# Patient Record
Sex: FEMALE | URBAN - METROPOLITAN AREA
[De-identification: names, ages, dates, MRNs, and addresses within clinical notes are randomized per-mention and may not be internally consistent; named-entity substitution may affect disease eponyms.]

---

## 2017-01-05 ENCOUNTER — THERAPY VISIT (OUTPATIENT)
Dept: PHYSICAL THERAPY | Facility: CLINIC | Age: 82
End: 2017-01-05
Payer: MEDICARE

## 2017-01-05 DIAGNOSIS — M25.512 ACUTE PAIN OF LEFT SHOULDER: Primary | ICD-10-CM

## 2017-01-05 PROCEDURE — 97110 THERAPEUTIC EXERCISES: CPT | Mod: GP | Performed by: PHYSICAL THERAPIST

## 2017-01-05 PROCEDURE — 97140 MANUAL THERAPY 1/> REGIONS: CPT | Mod: GP | Performed by: PHYSICAL THERAPIST

## 2017-01-06 ENCOUNTER — TELEPHONE (OUTPATIENT)
Dept: GASTROENTEROLOGY | Facility: CLINIC | Age: 82
End: 2017-01-06

## 2017-01-06 ENCOUNTER — TELEPHONE (OUTPATIENT)
Dept: INTERNAL MEDICINE | Facility: CLINIC | Age: 82
End: 2017-01-06

## 2017-01-06 DIAGNOSIS — Z12.11 ENCOUNTER FOR SCREENING COLONOSCOPY: Primary | ICD-10-CM

## 2017-01-06 NOTE — TELEPHONE ENCOUNTER
Dr. Hendrickson    Pt's daughter calling, states that pt is going to have Colonoscopy on 1/11, was told to hold Coumadin 5 days prior to Colonoscopy.  She took her last dose of Coumadin this evening.  Do you suggest any bridge ?    Soon-Mi

## 2017-01-09 NOTE — TELEPHONE ENCOUNTER
Spoke with pt's daughter Archana and given the message from ,verbalize understanding.  Rhona Noe RN

## 2017-01-10 ENCOUNTER — TELEPHONE (OUTPATIENT)
Dept: GASTROENTEROLOGY | Facility: CLINIC | Age: 82
End: 2017-01-10

## 2017-01-11 ENCOUNTER — SURGERY (OUTPATIENT)
Age: 82
End: 2017-01-11

## 2017-01-11 DIAGNOSIS — Z79.01 LONG TERM CURRENT USE OF ANTICOAGULANT THERAPY: Primary | ICD-10-CM

## 2017-01-11 LAB — INR PPP: 1.18 (ref 0.86–1.14)

## 2017-01-11 PROCEDURE — 85610 PROTHROMBIN TIME: CPT | Performed by: INTERNAL MEDICINE

## 2017-01-11 PROCEDURE — 36415 COLL VENOUS BLD VENIPUNCTURE: CPT | Performed by: INTERNAL MEDICINE

## 2017-01-11 RX ADMIN — Medication 2 ML: at 02:00

## 2017-01-11 RX ADMIN — FENTANYL CITRATE 50 MCG: 50 INJECTION, SOLUTION INTRAMUSCULAR; INTRAVENOUS at 11:40

## 2017-01-11 RX ADMIN — FENTANYL CITRATE 50 MCG: 50 INJECTION, SOLUTION INTRAMUSCULAR; INTRAVENOUS at 11:45

## 2017-01-11 RX ADMIN — BENZOCAINE 1 SPRAY: 220 SPRAY, METERED PERIODONTAL at 11:40

## 2017-01-11 RX ADMIN — MIDAZOLAM HYDROCHLORIDE 1 MG: 1 INJECTION, SOLUTION INTRAMUSCULAR; INTRAVENOUS at 11:54

## 2017-01-11 RX ADMIN — MIDAZOLAM HYDROCHLORIDE 1 MG: 1 INJECTION, SOLUTION INTRAMUSCULAR; INTRAVENOUS at 11:42

## 2017-01-11 RX ADMIN — FENTANYL CITRATE 50 MCG: 50 INJECTION, SOLUTION INTRAMUSCULAR; INTRAVENOUS at 11:56

## 2017-01-11 RX ADMIN — MIDAZOLAM HYDROCHLORIDE 1 MG: 1 INJECTION, SOLUTION INTRAMUSCULAR; INTRAVENOUS at 11:40

## 2017-01-11 RX ADMIN — MIDAZOLAM HYDROCHLORIDE 1 MG: 1 INJECTION, SOLUTION INTRAMUSCULAR; INTRAVENOUS at 11:45

## 2017-01-11 NOTE — TELEPHONE ENCOUNTER
Patient's daughter called. Patient half-way through the prep. Feels full and a little dizzy when standing. Has been having BM's but still brown. Instructed okay to take an hour break and then attempt again. Can try refrigerating, using straw or lemon candy to make more palatable. Patient and daughter agree.

## 2017-01-12 ENCOUNTER — THERAPY VISIT (OUTPATIENT)
Dept: PHYSICAL THERAPY | Facility: CLINIC | Age: 82
End: 2017-01-12
Payer: MEDICARE

## 2017-01-12 DIAGNOSIS — M25.512 ACUTE PAIN OF LEFT SHOULDER: Primary | ICD-10-CM

## 2017-01-12 PROCEDURE — 97110 THERAPEUTIC EXERCISES: CPT | Mod: GP | Performed by: PHYSICAL THERAPIST

## 2017-01-12 PROCEDURE — 97112 NEUROMUSCULAR REEDUCATION: CPT | Mod: GP | Performed by: PHYSICAL THERAPIST

## 2017-01-16 ENCOUNTER — THERAPY VISIT (OUTPATIENT)
Dept: PHYSICAL THERAPY | Facility: CLINIC | Age: 82
End: 2017-01-16
Payer: MEDICARE

## 2017-01-16 DIAGNOSIS — M25.512 ACUTE PAIN OF LEFT SHOULDER: Primary | ICD-10-CM

## 2017-01-16 PROCEDURE — G8985 CARRY GOAL STATUS: HCPCS | Mod: GP | Performed by: PHYSICAL THERAPIST

## 2017-01-16 PROCEDURE — G8986 CARRY D/C STATUS: HCPCS | Mod: GP | Performed by: PHYSICAL THERAPIST

## 2017-01-16 PROCEDURE — 97110 THERAPEUTIC EXERCISES: CPT | Mod: GP | Performed by: PHYSICAL THERAPIST

## 2017-01-16 PROCEDURE — G8984 CARRY CURRENT STATUS: HCPCS | Mod: GP | Performed by: PHYSICAL THERAPIST

## 2017-01-16 PROCEDURE — 97112 NEUROMUSCULAR REEDUCATION: CPT | Mod: GP | Performed by: PHYSICAL THERAPIST

## 2017-01-16 NOTE — PROGRESS NOTES
Subjective:    HPI                    Objective:    System                   Shoulder Evaluation:  ROM:  AROM:  : minimal pain reported by patient.  Flexion:  Left:  128      Extension: Left: 56  Abduction:  Left: 118 (with some forward flexion)                               Strength:    Flexion: Left:4-/5   Pain:      Extension:  Left: 4+/5    Pain:      Abduction:  Left: 3/5  Pain:                        Special Tests:  Special tests assessed shoulder: (+) Speeds, Eliseo-Van, Empty Can.  Left shoulder positive for the following special tests:  Impingement    Palpation:    Left shoulder tenderness present at:  Acrimioclavicular and Bicipital Groove    Mobility Tests:  not assessed                                                   General     ROS    Assessment/Plan:      DISCHARGE REPORT    Progress reporting period is from 12/29/16 to 1/16/17.       SUBJECTIVE  Subjective changes noted by patient:  Pt stated the L shoulder is feeling much better and feels she can continue with her HEP when in [place].  Patient if leaving for [place] on Thursday 1/19/17.    Current pain level is  5/10.     Previous pain level was  5/10.   Changes in function:  Yes (See Goal flowsheet attached for changes in current functional level)  Adverse reaction to treatment or activity: activity - increase pain with some activities    OBJECTIVE  Changes noted in objective findings:        ASSESSMENT/PLAN  Updated problem list and treatment plan: Diagnosis 1:  L shoulder pain  Pain -  hot/cold therapy and manual therapy  Decreased ROM/flexibility - manual therapy, therapeutic exercise, therapeutic activity and home program  Decreased strength - therapeutic exercise and therapeutic activities  Impaired muscle performance - neuro re-education  Decreased function - therapeutic activities  STG/LTGs have been met or progress has been made towards goals:  Yes (See Goal flow sheet completed today.)  Assessment of Progress: The patient's condition is  improving.  Self Management Plans:  Patient is independent in a home treatment program.        Recommendations:  Patient is moving back to [place]; D/C to HEP.    Please refer to the daily flowsheet for treatment today, total treatment time and time spent performing 1:1 timed codes.

## 2017-01-16 NOTE — Clinical Note
[facility] ATHLETIC [facility] PHYSICAL THERAPY  [address]  [place] [address]  [phone]    2017    Re: Vanessa Chicas   :   1932  MRN:  [id number]   REFERRING PHYSICIAN:   Mirian Garcia    [facility]THLETIC [facility] PHYSICAL [facility]    Date of Initial Evaluation:  2016  Visits:  Rxs Used: 4  Reason for Referral:  Acute pain of left shoulder    EVALUATION SUMMARY      DISCHARGE REPORT  Progress reporting period is from 16 to 17.       SUBJECTIVE  Subjective changes noted by patient:  Pt stated the L shoulder is feeling much better and feels she can continue with her HEP when in [place].  Patient if leaving for [place] on 17.    Current pain level is  5/10.     Previous pain level was  5/10.   Changes in function:  Yes (See Goal flowsheet attached for changes in current functional level)  Adverse reaction to treatment or activity: activity - increase pain with some activities  OBJECTIVE  Changes noted in objective findings:    Shoulder Evaluation:  ROM:  AROM:  : minimal pain reported by patient.  Flexion:  Left:  128      Extension: Left: 56  Abduction:  Left: 118 (with some forward flexion)     Strength:    Flexion: Left:4-/5   Pain:      Extension:  Left: 4+/5    Pain:      Abduction:  Left: 3/5  Pain:      Special Tests:  Special tests assessed shoulder: (+) Speeds, Eliseo-Van, Empty Can.  Left shoulder positive for the following special tests:  Impingement  Palpation:    Left shoulder tenderness present at:  Acrimioclavicular and Bicipital Groove  Mobility Tests:  not assessed  ASSESSMENT/PLAN  Updated problem list and treatment plan: Diagnosis 1:  L shoulder pain  Pain -  hot/cold therapy and manual therapy  Decreased ROM/flexibility - manual therapy, therapeutic exercise, therapeutic activity and home program  Decreased strength - therapeutic exercise and therapeutic  activities  Impaired muscle performance - neuro re-education  Decreased function - therapeutic activities  STG/LTGs have been met or progress has been made towards goals:  Yes (See Goal flow sheet completed today.)  Assessment of Progress: The patient's condition is improving.  Self Management Plans:  Patient is independent in a home treatment program.  Recommendations:  Patient is moving back to [place]; D/C to HEP.                Thank you for your referral.    INQUIRIES  Therapist: Laly Mclean, [facility]  [facility] PHYSICAL [facility]ERAPY  [address]  [place] [address]  Phone: [phone]  Fax: [phone]

## 2017-03-02 DIAGNOSIS — K29.71 GASTRITIS WITH HEMORRHAGE, UNSPECIFIED CHRONICITY, UNSPECIFIED GASTRITIS TYPE: ICD-10-CM

## 2017-03-03 RX ORDER — OMEPRAZOLE 40 MG/1
40 CAPSULE, DELAYED RELEASE ORAL DAILY
Qty: 180 CAPSULE | Refills: 2 | Status: SHIPPED | OUTPATIENT
Start: 2017-03-03

## 2017-03-03 NOTE — TELEPHONE ENCOUNTER
omeprazole (PRILOSEC) 40 MG capsule      Last Written Prescription Date:  12-20-16  Last Fill Quantity: 60,   # refills: 3  Last Office Visit : 12-20-16  Future Office visit:  none    Kathleen M Doege RN

## 2018-09-18 ENCOUNTER — THERAPY VISIT (OUTPATIENT)
Dept: PHYSICAL THERAPY | Facility: CLINIC | Age: 83
End: 2018-09-18
Payer: MEDICARE

## 2018-09-18 DIAGNOSIS — S22.000A COMPRESSION FRACTURE OF THORACIC VERTEBRA (H): Primary | ICD-10-CM

## 2018-09-18 PROCEDURE — 97112 NEUROMUSCULAR REEDUCATION: CPT | Mod: GP | Performed by: PHYSICAL THERAPIST

## 2018-09-18 PROCEDURE — 97110 THERAPEUTIC EXERCISES: CPT | Mod: GP | Performed by: PHYSICAL THERAPIST

## 2018-09-18 PROCEDURE — 97162 PT EVAL MOD COMPLEX 30 MIN: CPT | Mod: GP | Performed by: PHYSICAL THERAPIST

## 2018-09-18 PROCEDURE — G8978 MOBILITY CURRENT STATUS: HCPCS | Mod: GP | Performed by: PHYSICAL THERAPIST

## 2018-09-18 PROCEDURE — G8979 MOBILITY GOAL STATUS: HCPCS | Mod: GP | Performed by: PHYSICAL THERAPIST

## 2018-09-18 NOTE — MR AVS SNAPSHOT
After Visit Summary   9/18/2018    Vanessa Chicas    MRN: [id number]           Patient Information     Date Of Birth          7/6/1932        Visit Information        Provider Department      9/18/2018 3:20 PM Sally Head PT [facility]        Today's Diagnoses     Compression fracture of thoracic vertebra (H)    -  1       Follow-ups after your visit        Your next 10 appointments already scheduled     Sep 27, 2018  3:30 PM CDT   [facility] Spine with Sally Head PT   [facility]  )    [address] [facility] [id number] [phone]            Oct 09, 2018  3:20 PM CDT   [facility] Spine with Sally Head PT   [facility]  )    [address]  [place] [id number] [phone]              Who to contact     If you have questions or need follow up information about today's clinic visit or your schedule please contact [facility] ATHLETIC [facility] directly at [phone].  Normal or non-critical lab and imaging results will be communicated to you by MyChart, letter or phone within 4 business days after the clinic has received the results. If you do not hear from us within 7 days, please contact the clinic through MyChart or phone. If you have a critical or abnormal lab result, we will notify you by phone as soon as possible.  Submit refill requests through [employer] or call your pharmacy and they will forward the refill request to us. Please allow 3 business days for your refill to be completed.          Additional Information About Your Visit        Care EveryWhere ID     This is your Care EveryWhere ID. This could be used by other organizations to access your [facility] medical records  [id number]         Blood Pressure from Last 3 Encounters:   01/11/17 107/56   12/20/16 126/80    Weight from Last 3 Encounters:    01/11/17 80.3 kg (177 lb)   12/20/16 83.4 kg (183 lb 14.4 oz)              We Performed the Following     BROOKE CERT REPORT     [facility] Inital Eval Report     Neuromuscular Re-Education     PT Eval, Moderate Complexity (50514)     Therapeutic Exercises        Primary Care Provider Office Phone # Fax #    Mirian Katy Garcia [phone]       [address] [place] [address]  [facility] [address]        Equal Access to Services     CINTHYA CATHERINE : Hadii aad ku hadasho Soomaali, waaxda luqadaha, qaybta kaalmada adeegyada, waxay idiin hayaan adeeg kharash la'aan . So [facility] [phone].    ATENCIÓN: Si habla español, tiene a ansari disposición servicios gratuitos de asistencia lingüística. [facility] [phone].    We comply with applicable federal civil rights laws and [place] laws. We do not discriminate on the basis of race, color, national origin, age, disability, sex, sexual orientation, or gender identity.            Thank you!     Thank you for choosing [facility]  for your care. Our goal is always to provide you with excellent care. Hearing back from our patients is one way we can continue to improve our services. Please take a few minutes to complete the written survey that you may receive in the mail after your visit with us. Thank you!             Your Updated Medication List - Protect others around you: Learn how to safely use, store and throw away your medicines at www.disposemymeds.org.          This list is accurate as of 9/18/18 11:59 PM.  Always use your most recent med list.                   Brand Name Dispense Instructions for use Diagnosis    ADVAIR DISKUS 100-50 MCG/DOSE diskus inhaler   Generic drug:  fluticasone-salmeterol      Inhale 1 puff into the lungs        albuterol 108 (90 Base) MCG/ACT inhaler    PROAIR HFA/PROVENTIL HFA/VENTOLIN HFA     Inhale 2 puffs into the lungs        AMIODARONE HCL PO      Take 200 mg by mouth daily        diltiazem  180 MG 24 hr capsule    DILACOR XR     Take 180 mg by mouth        DIOVAN 80 MG tablet   Generic drug:  valsartan      Take 80 mg by mouth        furosemide 20 MG tablet    LASIX     Take 20 mg by mouth        gabapentin 100 MG capsule    NEURONTIN     Take 100 mg by mouth        LORazepam 1 MG tablet    ATIVAN     Take 0.5 mg by mouth        omeprazole 40 MG capsule    priLOSEC    180 capsule    Take 1 capsule (40 mg) by mouth daily    Gastritis with hemorrhage, unspecified chronicity, unspecified gastritis type       RA VITAMIN B-12 TR 1000 MCG Tbcr   Generic drug:  cyanocobalamin      Take 1,000 mcg by mouth        sertraline 50 MG tablet    ZOLOFT     Take 50 mg by mouth        sotalol 80 MG tablet    BETAPACE     Take 80 mg by mouth        traZODone 50 MG tablet    DESYREL     Take 25 mg by mouth        warfarin 2 MG tablet    COUMADIN     Take 2 mg by mouth

## 2018-09-18 NOTE — PROGRESS NOTES
[facility] Initial Evaluation  Subjective:  Patient is a 86 year old female presenting with rehab cervical spine hpi. The history is provided by the patient. History limited by: daughter also present  No  was used.   Vanessa Chicas is a 86 year old female with a thoracic spine condition.  Condition occurred with:  Lifting.  Condition occurred: at home.  This is a new condition  Patient reports that she was sitting on the floor and tried to lift some furniture and had sudden onset of back pain. SHe has a compression fracture in her thoracic spine, but not sure of the level. SHe went to rehab for 2 weeks.  She has now moved in with her daughter at this time. She has had PT and OT at home 1-2 time a week since home from the TCU. She has been using a walker for a couple of months now, but not all the time. MD order from 9-4-1-8..    Patient reports pain:  Lower thoracic.    Pain is described as aching and is constant and reported as 6/10.  Associated symptoms:  Loss of balance and loss of strength. Pain is the same all the time.  Symptoms are exacerbated by other, lifting and change of position (walk stephanie 10' with 8/10 with walker,  8-9/10 pain to get up from laying) and relieved by rest, ice, other and analgesics (Lidocain patches, meds).  Since onset symptoms are unchanged.        General health as reported by patient is fair.  Pertinent medical history includes:  Implanted device, sleep disorder/apnea, overweight, osteoporosis and other (a-fib).  Medical allergies: no.  Other surgeries include:  Heart surgery.  Current medications:  Heparin/coumadin, meds to increase bone density and pain medication.  Current occupation is retired.        Barriers include:  None as reported by the patient.    Red flags:  None as reported by the patient (presently living with daughter).                        Objective:  Standing Alignment:    Cervical/Thoracic:  Forward head and thoracic  kyphosis increased  Shoulder/UE:  Rounded shoulders                                  Cervical/Thoracic Evaluation  Arom wnl cervical: no pain with cervical AROM.     AROM:    AROM Thoracic:    Flexion:             Not assessed due to osteoporosis and recent comp Fx  Extension:          Mod to sign loss and slight ache  Rotation:            Left:     Right:      Headaches: none  Cervical Myotomes:  normal                  DTR's:  not assessed          Cervical Dermatomes:  normal                    Cervical Palpation:  : mid/low thoracic.  Tenderness present at Left:    Erector Spinae  Tenderness present at Right:    Erector Spinae        Cord Sign:  normal                                            General     ROS    Assessment/Plan:    Patient is a 86 year old female with thoracic complaints.    Patient has the following significant findings with corresponding treatment plan.                Diagnosis 1:  Compression fracture  Pain -  hot/cold therapy, US, manual therapy, self management, education, directional preference exercise and home program  Decreased ROM/flexibility - manual therapy, therapeutic exercise and home program  Decreased joint mobility - manual therapy, therapeutic exercise and home program  Impaired muscle performance - neuro re-education and home program  Decreased function - therapeutic activities and home program  Impaired posture - neuro re-education and home program    Therapy Evaluation Codes:   1) History comprised of:   Personal factors that impact the plan of care:      Age.    Comorbidity factors that impact the plan of care are:      osteoporosis.     Medications impacting care: None.  2) Examination of Body Systems comprised of:   Body structures and functions that impact the plan of care:      Thoracic Spine.   Activity limitations that impact the plan of care are:      Bending, Lifting, Walking and transitional movements.  3) Clinical presentation characteristics  are:   Evolving/Changing.  4) Decision-Making    Moderate complexity using standardized patient assessment instrument and/or measureable assessment of functional outcome.  Cumulative Therapy Evaluation is: Moderate complexity.    Previous and current functional limitations:  (See Goal Flow Sheet for this information)    Short term and Long term goals: (See Goal Flow Sheet for this information)     Communication ability:  Patient appears to be able to clearly communicate and understand verbal and written communication and follow directions correctly.  Treatment Explanation - The following has been discussed with the patient:   RX ordered/plan of care  Anticipated outcomes  Possible risks and side effects  This patient would benefit from PT intervention to resume normal activities.   Rehab potential is good.    Frequency:  1 X week, once daily  Duration:  for 8 weeks  Discharge Plan:  Achieve all LTG.  Independent in home treatment program.  Reach maximal therapeutic benefit.    Please refer to the daily flowsheet for treatment today, total treatment time and time spent performing 1:1 timed codes.

## 2018-09-18 NOTE — LETTER
DEPARTMENT OF HEALTH AND HUMAN SERVICES  [facility] FOR MEDICARE & MEDICAID SERVICES    PLAN/UPDATED PLAN OF PROGRESS FOR OUTPATIENT REHABILITATION    PATIENTS NAME:  Vanessa Chicas   : 1932  PROVID[facility] NUMBER:    [id number]  [id number]  PROVIDER NAME: [facility] LELO STEWART  MEDICAL RECORD NUMBER: [id number]   START OF CARE DATE:  SOC Date: 18   TYPE:  PT  PRIMARY/TREATMENT DIAGNOSIS: (Pertinent Medical Diagnosis)  Compression fracture of thoracic vertebra (H)  VISITS FROM START OF CARE:  Rxs Used: 1     [facility] Initial Evaluation  Subjective:  Patient is a 86 year old female presenting with rehab cervical spine hpi. The history is provided by the patient. History limited by: daughter also present  No  was used.   Vanessa Chicas is a 86 year old female with a thoracic spine condition.  Condition occurred with:  Lifting.  Condition occurred: at home.  This is a new condition  Patient reports that she was sitting on the floor and tried to lift some furniture and had sudden onset of back pain. SHe has a compression fracture in her thoracic spine, but not sure of the level. SHe went to rehab for 2 weeks.  She has now moved in with her daughter at this time. She has had PT and OT at home 1-2 time a week since home from the TCU. She has been using a walker for a couple of months now, but not all the time. MD order from 9-4-1-8..    Patient reports pain:  Lower thoracic.    Pain is described as aching and is constant and reported as 6/10.  Associated symptoms:  Loss of balance and loss of strength. Pain is the same all the time.  Symptoms are exacerbated by other, lifting and change of position (walk stephanie 10' with 8/10 with walker,  8-9/10 pain to get up from laying) and relieved by rest, ice, other and analgesics (Lidocain patches, meds).  Since onset symptoms are unchanged.        General health as reported by patient is fair.   Pertinent medical history includes:  Implanted device, sleep disorder/apnea, overweight, osteoporosis and other (a-fib).  Medical allergies: no.  Other surgeries include:  Heart surgery.  Current medications:  Heparin/coumadin, meds to increase bone density and pain medication.  Current occupation is retired.      Barriers include:  None as reported by the patient.  Red flags:  None as reported by the patient (presently living with daughter).  Objective:  Standing Alignment:    Cervical/Thoracic:  Forward head and thoracic kyphosis increased  Shoulder/UE:  Rounded shoulders  Cervical/Thoracic Evaluation  Arom wnl cervical: no pain with cervical AROM.     AROM:  AROM Thoracic:  Flexion:             Not assessed due to osteoporosis and recent comp Fx  Extension:          Mod to sign loss and slight ache  Rotation:            Left:     Right:    Headaches: none  Cervical Myotomes:  normal  DTR's:  not assessed  Cervical Dermatomes:  normal  Cervical Palpation:  : mid/low thoracic.  Tenderness present at Left:    Erector Spinae  Tenderness present at Right:    Erector Spinae  Cord Sign:  normal  Assessment/Plan:    Patient is a 86 year old female with thoracic complaints.    Patient has the following significant findings with corresponding treatment plan.                Diagnosis 1:  Compression fracture  Pain -  hot/cold therapy, US, manual therapy, self management, education, directional preference exercise and home program  Decreased ROM/flexibility - manual therapy, therapeutic exercise and home program  Decreased joint mobility - manual therapy, therapeutic exercise and home program  Impaired muscle performance - neuro re-education and home program  Decreased function - therapeutic activities and home program  Impaired posture - neuro re-education and home program                PATIENTS NAME:  Vanessa Chicas            : 1932    Therapy Evaluation Codes:   1) History comprised of:   Personal factors that  impact the plan of care:      Age.    Comorbidity factors that impact the plan of care are:      osteoporosis.     Medications impacting care: None.  2) Examination of Body Systems comprised of:   Body structures and functions that impact the plan of care:      Thoracic Spine.   Activity limitations that impact the plan of care are:      Bending, Lifting, Walking and transitional movements.  3) Clinical presentation characteristics are:   Evolving/Changing.  4) Decision-Making    Moderate complexity using standardized patient assessment instrument and/or measureable assessment of functional outcome.  Cumulative Therapy Evaluation is: Moderate complexity.    Previous and current functional limitations:  (See Goal Flow Sheet for this information)    Short term and Long term goals: (See Goal Flow Sheet for this information)     Communication ability:  Patient appears to be able to clearly communicate and understand verbal and written communication and follow directions correctly.  Treatment Explanation - The following has been discussed with the patient:   RX ordered/plan of care  Anticipated outcomes  Possible risks and side effects  This patient would benefit from PT intervention to resume normal activities.   Rehab potential is good.    Frequency:  1 X week, once daily  Duration:  for 8 weeks  Discharge Plan:  Achieve all LTG.  Independent in home treatment program.  Reach maximal therapeutic benefit.    Please refer to the daily flowsheet for treatment today, total treatment time and time spent performing 1:1 timed codes.       Caregiver Signature/Credentials ______________________________________________ Date ________        Sally Head PT   I have reviewed and certified the need for these services and plan of treatment while under my care.        PHYSICIAN'S SIGNATURE:   ______________________________________________  Date___________     Mayela Mauricio MD    Certification period:  Beginning of Cert  "date period: 09/18/18 to  End of Cert period date: 12/15/18     Functional Level Progress Report: Please see attached \"Goal Flow sheet for Functional level.\"    ____X____ Continue Services or       ________ DC Services                Service dates: From  SOC Date: 09/18/18 date to present                         "

## 2018-09-25 PROBLEM — S22.000A COMPRESSION FRACTURE OF THORACIC VERTEBRA (H): Status: ACTIVE | Noted: 2018-09-25

## 2018-09-27 ENCOUNTER — THERAPY VISIT (OUTPATIENT)
Dept: PHYSICAL THERAPY | Facility: CLINIC | Age: 83
End: 2018-09-27
Payer: MEDICARE

## 2018-09-27 DIAGNOSIS — S22.000A COMPRESSION FRACTURE OF THORACIC VERTEBRA (H): ICD-10-CM

## 2018-09-27 PROCEDURE — 97112 NEUROMUSCULAR REEDUCATION: CPT | Mod: GP | Performed by: PHYSICAL THERAPIST

## 2018-09-27 PROCEDURE — 97110 THERAPEUTIC EXERCISES: CPT | Mod: GP | Performed by: PHYSICAL THERAPIST

## 2018-09-27 NOTE — PROGRESS NOTES
Subjective:  HPI                    Objective:  System    Physical Exam    General     ROS    Assessment/Plan:    SUBJECTIVE  Subjective changes as noted by pt:  Patient reports that her back is still quite painful. SHe has done the exercises twice she was last here. She has less pain getting in and out of bed.         Current Pain level: 8/10   Changes in function:  Yes (See Goal flowsheet attached for changes in current functional level)     Adverse reaction to treatment or activity:  None    OBJECTIVE  Changes in objective findings:  Improvement noted in endurance, but still needs quite a few cues to perform exercises correctly. Had less pain going supine to and from sitting, but needed reminders yet for correct technique.          ASSESSMENT  Vanessa continues to require intervention to meet STG and LTG's: PT  Patient is progressing as expected.  Response to therapy has shown an improvement in  muscle control  Progress made towards STG/LTG?  Yes (See Goal flowsheet attached for updates on achievement of STG and LTG)    PLAN  Continue current treatment plan until patient demonstrates readiness to progress to higher level exercises.    PTA/ATC plan:  N/A    Please refer to the daily flowsheet for treatment today, total treatment time and time spent performing 1:1 timed codes.

## 2018-09-27 NOTE — MR AVS SNAPSHOT
After Visit Summary   9/27/2018    Vanessa Chicas    MRN: [id number]           Patient Information     Date Of Birth          7/6/1932        Visit Information        Provider Department      9/27/2018 3:30 PM Sally Head PT [facility]        Today's Diagnoses     Compression fracture of thoracic vertebra (H)           Follow-ups after your visit        Your next 10 appointments already scheduled     Oct 09, 2018  3:20 PM CDT   [facility] with Sally Head PT   [facility] [place] ([facility] [place]eta [place]  )    [address]  [facility] [id number] [phone]              Who to contact     If you have questions or need follow up information about today's clinic visit or your schedule please contact [facility]ENG directly at [phone].  Normal or non-critical lab and imaging results will be communicated to you by MyChart, letter or phone within 4 business days after the clinic has received the results. If you do not hear from us within 7 days, please contact the clinic through MyChart or phone. If you have a critical or abnormal lab result, we will notify you by phone as soon as possible.  Submit refill requests through [employer] or call your pharmacy and they will forward the refill request to us. Please allow 3 business days for your refill to be completed.          Additional Information About Your Visit        Care EveryWhere ID     This is your Care EveryWhere ID. This could be used by other organizations to access your [place] medical records  [id number]         Blood Pressure from Last 3 Encounters:   01/11/17 107/56   12/20/16 126/80    Weight from Last 3 Encounters:   01/11/17 80.3 kg (177 lb)   12/20/16 83.4 kg (183 lb 14.4 oz)              We Performed the Following     Neuromuscular Re-Education     Therapeutic Exercises        Primary Care Provider Office Phone # Fax #     Mirian Garcia [phone]       [address] [place] [phone]  [facility] [address]        Equal Access to Services     CINTHYA CATHERINE : Hadii aad ku hadradhakeena Kyle, wayumikoda luqadaha, qakarenta kaalmada tere, izaiah vargas paigemady wallace chago mejia. So [facility] [phone].    ATENCIÓN: Si habla español, tiene a ansari disposición servicios gratuitos de asistencia lingüística. Llame al [phone].    We comply with applicable federal civil rights laws and [place] laws. We do not discriminate on the basis of race, color, national origin, age, disability, sex, sexual orientation, or gender identity.            Thank you!     Thank you for choosing [place] [facility]  for your care. Our goal is always to provide you with excellent care. Hearing back from our patients is one way we can continue to improve our services. Please take a few minutes to complete the written survey that you may receive in the mail after your visit with us. Thank you!             Your Updated Medication List - Protect others around you: Learn how to safely use, store and throw away your medicines at www.disposemymeds.org.          This list is accurate as of 9/27/18 11:59 PM.  Always use your most recent med list.                   Brand Name Dispense Instructions for use Diagnosis    ADVAIR DISKUS 100-50 MCG/DOSE diskus inhaler   Generic drug:  fluticasone-salmeterol      Inhale 1 puff into the lungs        albuterol 108 (90 Base) MCG/ACT inhaler    PROAIR HFA/PROVENTIL HFA/VENTOLIN HFA     Inhale 2 puffs into the lungs        AMIODARONE HCL PO      Take 200 mg by mouth daily        diltiazem 180 MG 24 hr capsule    DILACOR XR     Take 180 mg by mouth        DIOVAN 80 MG tablet   Generic drug:  valsartan      Take 80 mg by mouth        furosemide 20 MG tablet    LASIX     Take 20 mg by mouth        gabapentin 100 MG capsule    NEURONTIN     Take 100 mg by mouth        LORazepam 1 MG tablet     ATIVAN     Take 0.5 mg by mouth        omeprazole 40 MG capsule    priLOSEC    180 capsule    Take 1 capsule (40 mg) by mouth daily    Gastritis with hemorrhage, unspecified chronicity, unspecified gastritis type       RA VITAMIN B-12 TR 1000 MCG Tbcr   Generic drug:  cyanocobalamin      Take 1,000 mcg by mouth        sertraline 50 MG tablet    ZOLOFT     Take 50 mg by mouth        sotalol 80 MG tablet    BETAPACE     Take 80 mg by mouth        traZODone 50 MG tablet    DESYREL     Take 25 mg by mouth        warfarin 2 MG tablet    COUMADIN     Take 2 mg by mouth

## 2018-10-09 ENCOUNTER — THERAPY VISIT (OUTPATIENT)
Dept: PHYSICAL THERAPY | Facility: CLINIC | Age: 83
End: 2018-10-09
Payer: MEDICARE

## 2018-10-09 DIAGNOSIS — S22.000A COMPRESSION FRACTURE OF THORACIC VERTEBRA (H): ICD-10-CM

## 2018-10-09 PROCEDURE — 97110 THERAPEUTIC EXERCISES: CPT | Mod: GP | Performed by: PHYSICAL THERAPIST

## 2018-10-09 PROCEDURE — 97112 NEUROMUSCULAR REEDUCATION: CPT | Mod: GP | Performed by: PHYSICAL THERAPIST

## 2018-10-09 NOTE — PROGRESS NOTES
Subjective:  HPI                    Objective:  System    Physical Exam    General     ROS    Assessment/Plan:    SUBJECTIVE  Subjective changes as noted by pt:  Patient reports that her pain is in the central lower back and not in the thoracic region much anymore. She is going to have a CT scan next week to her lower back. She is just really tired today.      Current Pain level: 6/10   Changes in function:  Yes (See Goal flowsheet attached for changes in current functional level)     Adverse reaction to treatment or activity:  None    OBJECTIVE  Changes in objective findings:  Standing LROM: FB with fingertips to mid lower leg and no change and increase with 5 reps of RFIS and BB mod to sign loss and no change and slight ache with BETO. Still needs reminders to use neutral postures.         ASSESSMENT  Vanessa continues to require intervention to meet STG and LTG's: PT  Patient is progressing as expected.  Response to therapy has shown lack of progress in  pain level  Progress made towards STG/LTG?  Yes (See Goal flowsheet attached for updates on achievement of STG and LTG)    PLAN  Continue current treatment plan until patient demonstrates readiness to progress to higher level exercises.    PTA/ATC plan:  N/A    Please refer to the daily flowsheet for treatment today, total treatment time and time spent performing 1:1 timed codes.

## 2018-10-09 NOTE — MR AVS SNAPSHOT
After Visit Summary   10/9/2018    Vanessa Chicas    MRN: [id number]           Patient Information     Date Of Birth          7/6/1932        Visit Information        Provider Department      10/9/2018 3:20 PM Sally Head PT [facility] [place]        Today's Diagnoses     Compression fracture of thoracic vertebra (H)           Follow-ups after your visit        Who to contact     If you have questions or need follow up information about today's clinic visit or your schedule please contact [facility] directly at [phone].  Normal or non-critical lab and imaging results will be communicated to you by MyChart, letter or phone within 4 business days after the clinic has received the results. If you do not hear from us within 7 days, please contact the clinic through MyChart or phone. If you have a critical or abnormal lab result, we will notify you by phone as soon as possible.  Submit refill requests through [email or web address] or call your pharmacy and they will forward the refill request to us. Please allow 3 business days for your refill to be completed.          Additional Information About Your Visit        Care EveryWhere ID     This is your Care EveryWhere ID. This could be used by other organizations to access your [place] medical records  [id number]         Blood Pressure from Last 3 Encounters:   01/11/17 107/56   12/20/16 126/80    Weight from Last 3 Encounters:   01/11/17 80.3 kg (177 lb)   12/20/16 83.4 kg (183 lb 14.4 oz)              We Performed the Following     Neuromuscular Re-Education     Therapeutic Exercises        Primary Care Provider Office Phone # Fax #    Mirian Garcia [phone]       [address] [place] [address]  [facility] [address]        Equal Access to Services     CINTHYA CATHERINE AH: davy Beckham qaybta kaalmada adeegyada, waxay idiin hayaan  paigemady yisselaltagracia laLoniaadagoberto ah. So [facility] [phone].    ATENCIÓN: Si shreyala santo, tiene a ansari disposición servicios gratuitos de asistencia lingüística. Dina al [phone].    We comply with applicable federal civil rights laws and [place] laws. We do not discriminate on the basis of race, color, national origin, age, disability, sex, sexual orientation, or gender identity.            Thank you!     Thank you for choosing [place] [facility]  for your care. Our goal is always to provide you with excellent care. Hearing back from our patients is one way we can continue to improve our services. Please take a few minutes to complete the written survey that you may receive in the mail after your visit with us. Thank you!             Your Updated Medication List - Protect others around you: Learn how to safely use, store and throw away your medicines at www.disposemymeds.org.          This list is accurate as of 10/9/18 11:59 PM.  Always use your most recent med list.                   Brand Name Dispense Instructions for use Diagnosis    ADVAIR DISKUS 100-50 MCG/DOSE diskus inhaler   Generic drug:  fluticasone-salmeterol      Inhale 1 puff into the lungs        albuterol 108 (90 Base) MCG/ACT inhaler    PROAIR HFA/PROVENTIL HFA/VENTOLIN HFA     Inhale 2 puffs into the lungs        AMIODARONE HCL PO      Take 200 mg by mouth daily        diltiazem 180 MG 24 hr capsule    DILACOR XR     Take 180 mg by mouth        DIOVAN 80 MG tablet   Generic drug:  valsartan      Take 80 mg by mouth        furosemide 20 MG tablet    LASIX     Take 20 mg by mouth        gabapentin 100 MG capsule    NEURONTIN     Take 100 mg by mouth        LORazepam 1 MG tablet    ATIVAN     Take 0.5 mg by mouth        omeprazole 40 MG capsule    priLOSEC    180 capsule    Take 1 capsule (40 mg) by mouth daily    Gastritis with hemorrhage, unspecified chronicity, unspecified gastritis type       RA VITAMIN B-12 TR 1000 MCG Tbcr    Generic drug:  cyanocobalamin      Take 1,000 mcg by mouth        sertraline 50 MG tablet    ZOLOFT     Take 50 mg by mouth        sotalol 80 MG tablet    BETAPACE     Take 80 mg by mouth        traZODone 50 MG tablet    DESYREL     Take 25 mg by mouth        warfarin 2 MG tablet    COUMADIN     Take 2 mg by mouth

## 2019-02-05 PROBLEM — S22.000A COMPRESSION FRACTURE OF THORACIC VERTEBRA (H): Status: RESOLVED | Noted: 2018-09-25 | Resolved: 2019-02-05

## 2019-02-05 NOTE — PROGRESS NOTES
Subjective:  HPI                    Objective:  System    Physical Exam    General     ROS    Assessment/Plan:    DISCHARGE REPORT    Progress reporting period is from 9-18-18 to 10-9-18.     SUBJECTIVE      Current Pain level: 6/10            ;   ,     Patient has failed to return to therapy so current objective findings are unknown.  The subjective and objective information are from the last SOAP note on this patient.    OBJECTIVE         ASSESSMENT/PLAN  Updated problem list and treatment plan: Diagnosis 1: thoracic compression fracture   Pain -  self management, education, directional preference exercise and home program  Decreased ROM/flexibility - manual therapy, therapeutic exercise and home program  Decreased strength - therapeutic exercise, therapeutic activities and home program  Decreased function - therapeutic activities and home program  Impaired posture - neuro re-education and home program  STG/LTGs have been met or progress has been made towards goals:  Yes (See Goal flow sheet completed today.)  Assessment of Progress: The patient has not returned to therapy. Current status is unknown.  Self Management Plans:  Patient has been instructed in a home treatment program.  Patient  has been instructed in self management of symptoms.    Vanessa continues to require the following intervention to meet STG and LTG's:   The patient failed to complete scheduled/ordered appointments so current information is unknown.  We will discharge this patient from PT.    Recommendations:      Please refer to the daily flowsheet for treatment today, total treatment time and time spent performing 1:1 timed codes.

## 2019-04-07 ENCOUNTER — OFFICE VISIT (OUTPATIENT)
Dept: URGENT CARE | Facility: URGENT CARE | Age: 84
End: 2019-04-07
Payer: MEDICARE

## 2019-04-07 ENCOUNTER — ANCILLARY PROCEDURE (OUTPATIENT)
Dept: GENERAL RADIOLOGY | Facility: CLINIC | Age: 84
End: 2019-04-07
Attending: PHYSICIAN ASSISTANT
Payer: MEDICARE

## 2019-04-07 VITALS
WEIGHT: 176.4 LBS | HEART RATE: 89 BPM | BODY MASS INDEX: 30.28 KG/M2 | DIASTOLIC BLOOD PRESSURE: 75 MMHG | SYSTOLIC BLOOD PRESSURE: 119 MMHG | OXYGEN SATURATION: 95 % | TEMPERATURE: 97.6 F

## 2019-04-07 DIAGNOSIS — J22 LOWER RESPIRATORY INFECTION: Primary | ICD-10-CM

## 2019-04-07 DIAGNOSIS — I48.91 ON COUMADIN FOR ATRIAL FIBRILLATION (H): ICD-10-CM

## 2019-04-07 DIAGNOSIS — R05.9 COUGH: ICD-10-CM

## 2019-04-07 DIAGNOSIS — Z79.01 ON COUMADIN FOR ATRIAL FIBRILLATION (H): ICD-10-CM

## 2019-04-07 DIAGNOSIS — R06.2 WHEEZING: ICD-10-CM

## 2019-04-07 PROCEDURE — 71046 X-RAY EXAM CHEST 2 VIEWS: CPT

## 2019-04-07 PROCEDURE — 99214 OFFICE O/P EST MOD 30 MIN: CPT | Mod: 25 | Performed by: PHYSICIAN ASSISTANT

## 2019-04-07 PROCEDURE — 94640 AIRWAY INHALATION TREATMENT: CPT | Performed by: PHYSICIAN ASSISTANT

## 2019-04-07 RX ORDER — HYDROXYZINE HYDROCHLORIDE 25 MG/1
TABLET, FILM COATED ORAL
COMMUNITY
Start: 2019-01-08

## 2019-04-07 RX ORDER — SPIRONOLACTONE 25 MG/1
TABLET ORAL
COMMUNITY
Start: 2018-07-17

## 2019-04-07 RX ORDER — DOXYCYCLINE HYCLATE 100 MG
100 TABLET ORAL 2 TIMES DAILY
Qty: 14 TABLET | Refills: 0 | Status: SHIPPED | OUTPATIENT
Start: 2019-04-07 | End: 2019-04-14

## 2019-04-07 RX ORDER — ALBUTEROL SULFATE 0.83 MG/ML
2.5 SOLUTION RESPIRATORY (INHALATION) ONCE
Status: COMPLETED | OUTPATIENT
Start: 2019-04-07 | End: 2019-04-07

## 2019-04-07 RX ORDER — METHOCARBAMOL 500 MG/1
TABLET, FILM COATED ORAL
COMMUNITY
Start: 2018-07-11

## 2019-04-07 RX ORDER — LOSARTAN POTASSIUM 50 MG/1
50 TABLET ORAL
COMMUNITY
Start: 2018-09-25

## 2019-04-07 RX ORDER — ACETAMINOPHEN 500 MG
1000 TABLET ORAL
COMMUNITY
Start: 2018-07-23

## 2019-04-07 RX ORDER — SENNOSIDES A AND B 8.6 MG/1
1-2 TABLET, FILM COATED ORAL
COMMUNITY
Start: 2018-08-02

## 2019-04-07 RX ORDER — OXYCODONE HYDROCHLORIDE 5 MG/1
TABLET ORAL
Refills: 0 | COMMUNITY
Start: 2018-10-30

## 2019-04-07 RX ORDER — POLYETHYLENE GLYCOL 3350 17 G/17G
17 POWDER, FOR SOLUTION ORAL
COMMUNITY
Start: 2018-08-02

## 2019-04-07 RX ADMIN — ALBUTEROL SULFATE 2.5 MG: 0.83 SOLUTION RESPIRATORY (INHALATION) at 11:03

## 2019-04-07 NOTE — PROGRESS NOTES
The following nebulizer treatment was given:     MEDICATION: Albuterol Sulfate 2.5 mg  : [employer]!  LOT #: [id number]  EXPIRATION DATE:  10/20  NDC # 89848-934-21     Nebulizer Start Time:  1049  Nebulizer Stop Time:  1055  See Vital Signs Flowsheet  Katy Silva [facility]

## 2023-08-04 ENCOUNTER — LAB REQUISITION (OUTPATIENT)
Dept: LAB | Facility: CLINIC | Age: 88
End: 2023-08-04
Payer: OTHER GOVERNMENT

## 2023-08-04 DIAGNOSIS — I11.0 HYPERTENSIVE HEART DISEASE WITH HEART FAILURE (H): ICD-10-CM

## 2023-08-07 LAB
ANION GAP SERPL CALCULATED.3IONS-SCNC: 11 MMOL/L (ref 7–15)
BUN SERPL-MCNC: 19.9 MG/DL (ref 8–23)
CALCIUM SERPL-MCNC: 9.4 MG/DL (ref 8.2–9.6)
CHLORIDE SERPL-SCNC: 102 MMOL/L (ref 98–107)
CREAT SERPL-MCNC: 0.85 MG/DL (ref 0.51–0.95)
DEPRECATED HCO3 PLAS-SCNC: 28 MMOL/L (ref 22–29)
ERYTHROCYTE [DISTWIDTH] IN BLOOD BY AUTOMATED COUNT: 15.7 % (ref 10–15)
GFR SERPL CREATININE-BSD FRML MDRD: 64 ML/MIN/1.73M2
GLUCOSE SERPL-MCNC: 106 MG/DL (ref 70–99)
HCT VFR BLD AUTO: 34.9 % (ref 35–47)
HGB BLD-MCNC: 10.5 G/DL (ref 11.7–15.7)
MCH RBC QN AUTO: 28.2 PG (ref 26.5–33)
MCHC RBC AUTO-ENTMCNC: 30.1 G/DL (ref 31.5–36.5)
MCV RBC AUTO: 94 FL (ref 78–100)
PLATELET # BLD AUTO: 288 10E3/UL (ref 150–450)
POTASSIUM SERPL-SCNC: 4.6 MMOL/L (ref 3.4–5.3)
RBC # BLD AUTO: 3.72 10E6/UL (ref 3.8–5.2)
SODIUM SERPL-SCNC: 141 MMOL/L (ref 136–145)
WBC # BLD AUTO: 7.9 10E3/UL (ref 4–11)

## 2023-08-07 PROCEDURE — P9604 ONE-WAY ALLOW PRORATED TRIP: HCPCS | Performed by: NURSE PRACTITIONER

## 2023-08-07 PROCEDURE — 80048 BASIC METABOLIC PNL TOTAL CA: CPT | Performed by: NURSE PRACTITIONER

## 2023-08-07 PROCEDURE — 36415 COLL VENOUS BLD VENIPUNCTURE: CPT | Performed by: NURSE PRACTITIONER

## 2023-08-07 PROCEDURE — 85027 COMPLETE CBC AUTOMATED: CPT | Performed by: NURSE PRACTITIONER

## 2024-01-12 ENCOUNTER — LAB REQUISITION (OUTPATIENT)
Dept: LAB | Facility: CLINIC | Age: 89
End: 2024-01-12
Payer: OTHER GOVERNMENT

## 2024-01-12 DIAGNOSIS — I50.22 CHRONIC SYSTOLIC (CONGESTIVE) HEART FAILURE (H): ICD-10-CM

## 2024-01-12 DIAGNOSIS — J96.00 ACUTE RESPIRATORY FAILURE, UNSPECIFIED WHETHER WITH HYPOXIA OR HYPERCAPNIA (H): ICD-10-CM

## 2024-01-15 ENCOUNTER — LAB REQUISITION (OUTPATIENT)
Dept: LAB | Facility: CLINIC | Age: 89
End: 2024-01-15
Payer: OTHER GOVERNMENT

## 2024-01-15 DIAGNOSIS — R11.0 NAUSEA: ICD-10-CM

## 2024-01-15 LAB
ANION GAP SERPL CALCULATED.3IONS-SCNC: 12 MMOL/L (ref 7–15)
BUN SERPL-MCNC: 18.5 MG/DL (ref 8–23)
CALCIUM SERPL-MCNC: 9.5 MG/DL (ref 8.2–9.6)
CHLORIDE SERPL-SCNC: 105 MMOL/L (ref 98–107)
CREAT SERPL-MCNC: 0.83 MG/DL (ref 0.51–0.95)
DEPRECATED HCO3 PLAS-SCNC: 24 MMOL/L (ref 22–29)
EGFRCR SERPLBLD CKD-EPI 2021: 66 ML/MIN/1.73M2
ERYTHROCYTE [DISTWIDTH] IN BLOOD BY AUTOMATED COUNT: 16.7 % (ref 10–15)
GLUCOSE SERPL-MCNC: 100 MG/DL (ref 70–99)
HCT VFR BLD AUTO: 41.5 % (ref 35–47)
HGB BLD-MCNC: 13.5 G/DL (ref 11.7–15.7)
MCH RBC QN AUTO: 28.9 PG (ref 26.5–33)
MCHC RBC AUTO-ENTMCNC: 32.5 G/DL (ref 31.5–36.5)
MCV RBC AUTO: 89 FL (ref 78–100)
PLATELET # BLD AUTO: 190 10E3/UL (ref 150–450)
POTASSIUM SERPL-SCNC: 4.1 MMOL/L (ref 3.4–5.3)
RBC # BLD AUTO: 4.67 10E6/UL (ref 3.8–5.2)
SODIUM SERPL-SCNC: 141 MMOL/L (ref 135–145)
WBC # BLD AUTO: 5.9 10E3/UL (ref 4–11)

## 2024-01-15 PROCEDURE — P9604 ONE-WAY ALLOW PRORATED TRIP: HCPCS | Performed by: NURSE PRACTITIONER

## 2024-01-15 PROCEDURE — 80048 BASIC METABOLIC PNL TOTAL CA: CPT | Performed by: NURSE PRACTITIONER

## 2024-01-15 PROCEDURE — 36415 COLL VENOUS BLD VENIPUNCTURE: CPT | Performed by: NURSE PRACTITIONER

## 2024-01-15 PROCEDURE — 85027 COMPLETE CBC AUTOMATED: CPT | Performed by: NURSE PRACTITIONER

## 2024-01-16 LAB
ALBUMIN SERPL BCG-MCNC: 4 G/DL (ref 3.5–5.2)
ALP SERPL-CCNC: 98 U/L (ref 40–150)
ALT SERPL W P-5'-P-CCNC: 16 U/L (ref 0–50)
ANION GAP SERPL CALCULATED.3IONS-SCNC: 11 MMOL/L (ref 7–15)
AST SERPL W P-5'-P-CCNC: 22 U/L (ref 0–45)
BILIRUB SERPL-MCNC: 0.4 MG/DL
BUN SERPL-MCNC: 21.3 MG/DL (ref 8–23)
CALCIUM SERPL-MCNC: 9.7 MG/DL (ref 8.2–9.6)
CHLORIDE SERPL-SCNC: 101 MMOL/L (ref 98–107)
CREAT SERPL-MCNC: 0.87 MG/DL (ref 0.51–0.95)
DEPRECATED HCO3 PLAS-SCNC: 27 MMOL/L (ref 22–29)
EGFRCR SERPLBLD CKD-EPI 2021: 63 ML/MIN/1.73M2
ERYTHROCYTE [DISTWIDTH] IN BLOOD BY AUTOMATED COUNT: 17.2 % (ref 10–15)
GLUCOSE SERPL-MCNC: 82 MG/DL (ref 70–99)
HCT VFR BLD AUTO: 44 % (ref 35–47)
HGB BLD-MCNC: 14 G/DL (ref 11.7–15.7)
MCH RBC QN AUTO: 28.7 PG (ref 26.5–33)
MCHC RBC AUTO-ENTMCNC: 31.8 G/DL (ref 31.5–36.5)
MCV RBC AUTO: 90 FL (ref 78–100)
PLATELET # BLD AUTO: 212 10E3/UL (ref 150–450)
POTASSIUM SERPL-SCNC: 4.2 MMOL/L (ref 3.4–5.3)
PROT SERPL-MCNC: 6.7 G/DL (ref 6.4–8.3)
RBC # BLD AUTO: 4.87 10E6/UL (ref 3.8–5.2)
SODIUM SERPL-SCNC: 139 MMOL/L (ref 135–145)
WBC # BLD AUTO: 6.2 10E3/UL (ref 4–11)

## 2024-01-16 PROCEDURE — 85014 HEMATOCRIT: CPT | Performed by: NURSE PRACTITIONER

## 2024-01-16 PROCEDURE — 80053 COMPREHEN METABOLIC PANEL: CPT | Performed by: NURSE PRACTITIONER

## 2024-01-16 PROCEDURE — 36415 COLL VENOUS BLD VENIPUNCTURE: CPT | Performed by: NURSE PRACTITIONER

## 2024-01-16 PROCEDURE — P9604 ONE-WAY ALLOW PRORATED TRIP: HCPCS | Performed by: NURSE PRACTITIONER

## 2024-04-01 ENCOUNTER — LAB REQUISITION (OUTPATIENT)
Dept: LAB | Facility: CLINIC | Age: 89
End: 2024-04-01
Payer: OTHER GOVERNMENT

## 2024-04-01 DIAGNOSIS — N18.31 CHRONIC KIDNEY DISEASE, STAGE 3A (H): ICD-10-CM

## 2024-04-02 LAB
ALBUMIN SERPL BCG-MCNC: 4 G/DL (ref 3.5–5.2)
ALP SERPL-CCNC: 108 U/L (ref 40–150)
ALT SERPL W P-5'-P-CCNC: 17 U/L (ref 0–50)
ANION GAP SERPL CALCULATED.3IONS-SCNC: 11 MMOL/L (ref 7–15)
AST SERPL W P-5'-P-CCNC: 19 U/L (ref 0–45)
BILIRUB SERPL-MCNC: 0.7 MG/DL
BUN SERPL-MCNC: 18 MG/DL (ref 8–23)
CALCIUM SERPL-MCNC: 9.7 MG/DL (ref 8.2–9.6)
CHLORIDE SERPL-SCNC: 99 MMOL/L (ref 98–107)
CREAT SERPL-MCNC: 0.9 MG/DL (ref 0.51–0.95)
DEPRECATED HCO3 PLAS-SCNC: 29 MMOL/L (ref 22–29)
EGFRCR SERPLBLD CKD-EPI 2021: 60 ML/MIN/1.73M2
ERYTHROCYTE [DISTWIDTH] IN BLOOD BY AUTOMATED COUNT: 14.7 % (ref 10–15)
GLUCOSE SERPL-MCNC: 113 MG/DL (ref 70–99)
HCT VFR BLD AUTO: 40.5 % (ref 35–47)
HGB BLD-MCNC: 12.8 G/DL (ref 11.7–15.7)
MCH RBC QN AUTO: 29.6 PG (ref 26.5–33)
MCHC RBC AUTO-ENTMCNC: 31.6 G/DL (ref 31.5–36.5)
MCV RBC AUTO: 94 FL (ref 78–100)
PLATELET # BLD AUTO: 218 10E3/UL (ref 150–450)
POTASSIUM SERPL-SCNC: 4.3 MMOL/L (ref 3.4–5.3)
PROT SERPL-MCNC: 6.5 G/DL (ref 6.4–8.3)
RBC # BLD AUTO: 4.32 10E6/UL (ref 3.8–5.2)
SODIUM SERPL-SCNC: 139 MMOL/L (ref 135–145)
WBC # BLD AUTO: 6.5 10E3/UL (ref 4–11)

## 2024-04-02 PROCEDURE — 36415 COLL VENOUS BLD VENIPUNCTURE: CPT | Performed by: NURSE PRACTITIONER

## 2024-04-02 PROCEDURE — 85027 COMPLETE CBC AUTOMATED: CPT | Performed by: NURSE PRACTITIONER

## 2024-04-02 PROCEDURE — P9604 ONE-WAY ALLOW PRORATED TRIP: HCPCS | Performed by: NURSE PRACTITIONER

## 2024-04-02 PROCEDURE — 80053 COMPREHEN METABOLIC PANEL: CPT | Performed by: NURSE PRACTITIONER
